# Patient Record
Sex: FEMALE | Race: WHITE
[De-identification: names, ages, dates, MRNs, and addresses within clinical notes are randomized per-mention and may not be internally consistent; named-entity substitution may affect disease eponyms.]

---

## 2020-02-19 ENCOUNTER — HOSPITAL ENCOUNTER (OUTPATIENT)
Dept: HOSPITAL 92 - BICMRI | Age: 20
Discharge: HOME | End: 2020-02-19
Attending: ORTHOPAEDIC SURGERY
Payer: OTHER GOVERNMENT

## 2020-02-19 DIAGNOSIS — M25.562: Primary | ICD-10-CM

## 2020-02-19 DIAGNOSIS — S70.12XA: ICD-10-CM

## 2020-02-19 DIAGNOSIS — S83.242A: ICD-10-CM

## 2020-02-19 DIAGNOSIS — S86.812A: ICD-10-CM

## 2020-02-19 DIAGNOSIS — S83.512A: ICD-10-CM

## 2020-02-19 NOTE — MRI
MRI OF THE LEFT KNEE PERFORMED WITHOUT CONTRAST ENHANCEMENT:

2/19/20

 

HISTORY: 

Right knee pain after twisting injury. 

 

The ACL is absent. There is very little residual fibers present. I would still presume this is most l
ikely acute in nature as there is a posterior tibial bone contusion on the lateral side and osteochon
dral impaction type injury of the lateral femoral condyle. Posterior cruciate ligament is intact. The
re is an undersurface tear of the posterior horn and the medial meniscus. This has more of a flap  or
ientation. The tear does extend to involve the body of the meniscus. The lateral meniscus has a yves
l shape and contour. 

 

There are edema changes both superficial and deep to the medial collateral ligament and the meniscofe
moral ligament is indistinct. The meniscotibial ligament is visualized. 

 

The lateral collateral ligament is intact. There is some edema change adjacent to the posterolateral 
corner and some edema changes related to the soleus muscle directly posterior to the fibula suggests 
some element of muscle strain in this region. 

 

IMPRESSION: 

1.      ACL tear. It is actually difficult to identify any fibers to the ACL but the presence of post
erior tibial and lateral femoral condyle bone contusions would suggest that this is still probably an
 acute injury. 

2.      Fairly extensive undersurface tear involving the posterior horn and body of the medial menisc
us. There is also edema changes and indistinct appearance of the meniscofemoral ligament. The MCL is 
intact although there are edema changes superficial and deep to the MCL.

3.      Mild edema changes involving the posterior lateral corner of the lateral collateral ligament 
is intact.  Popliteal fibular ligament is indistinct and there is some evidence for soleus muscle str
ain. 

 

POS: [facility]

## 2020-03-06 ENCOUNTER — HOSPITAL ENCOUNTER (OUTPATIENT)
Dept: HOSPITAL 92 - SDC | Age: 20
Setting detail: OBSERVATION
LOS: 1 days | Discharge: HOME | End: 2020-03-07
Attending: ORTHOPAEDIC SURGERY | Admitting: ORTHOPAEDIC SURGERY
Payer: OTHER GOVERNMENT

## 2020-03-06 VITALS — BODY MASS INDEX: 24.7 KG/M2
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
## 2020-03-06 DIAGNOSIS — S83.282A: ICD-10-CM

## 2020-03-06 DIAGNOSIS — S83.242A: ICD-10-CM

## 2020-03-06 DIAGNOSIS — S89.82XA: ICD-10-CM

## 2020-03-06 DIAGNOSIS — G89.18: ICD-10-CM

## 2020-03-06 DIAGNOSIS — Z79.899: ICD-10-CM

## 2020-03-06 DIAGNOSIS — S83.512A: Primary | ICD-10-CM
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
## 2020-03-06 PROCEDURE — 0MRP47Z REPLACEMENT OF LEFT KNEE BURSA AND LIGAMENT WITH AUTOLOGOUS TISSUE SUBSTITUTE, PERCUTANEOUS ENDOSCOPIC APPROACH: ICD-10-PCS | Performed by: ORTHOPAEDIC SURGERY

## 2020-03-06 PROCEDURE — G0378 HOSPITAL OBSERVATION PER HR: HCPCS

## 2020-03-06 PROCEDURE — A4306 DRUG DELIVERY SYSTEM <=50 ML: HCPCS

## 2020-03-06 PROCEDURE — 3E0T3BZ INTRODUCTION OF ANESTHETIC AGENT INTO PERIPHERAL NERVES AND PLEXI, PERCUTANEOUS APPROACH: ICD-10-PCS | Performed by: ORTHOPAEDIC SURGERY

## 2020-03-06 PROCEDURE — 96365 THER/PROPH/DIAG IV INF INIT: CPT

## 2020-03-06 PROCEDURE — 96366 THER/PROPH/DIAG IV INF ADDON: CPT

## 2020-03-06 PROCEDURE — C1713 ANCHOR/SCREW BN/BN,TIS/BN: HCPCS

## 2020-03-06 PROCEDURE — 0SBD4ZZ EXCISION OF LEFT KNEE JOINT, PERCUTANEOUS ENDOSCOPIC APPROACH: ICD-10-PCS | Performed by: ORTHOPAEDIC SURGERY

## 2020-03-06 RX ADMIN — CEFAZOLIN SODIUM SCH MLS: 2 SOLUTION INTRAVENOUS at 15:12

## 2020-03-06 RX ADMIN — CEFAZOLIN SODIUM SCH MLS: 2 SOLUTION INTRAVENOUS at 20:05

## 2020-03-06 NOTE — OP
DATE OF PROCEDURE:  03/06/2020



PREOPERATIVE DIAGNOSES:  Left knee Anterior cruciate ligament tear and an 
undersurface

posterior horn medial meniscus tear. 



POSTOPERATIVE DIAGNOSES:  

1. Left knee Anterior cruciate ligament tear.

2. Some chondral injury to the medial femoral condyle near the notch with some 
of

the areas being grade 2, but most of this just being fissuring. 

3. Stable undersurface tear of the posterior horn medial meniscus which is less 
than

5 mm. 

4. A stable superior surface tear of the lateral meniscus posterior horn.



PROCEDURE:

1. Exam under anesthesia Left knee

2. Left knee arthroscopy with arthroscopically assisted ACL Reconstruction 
using autologous patellar tendon graft



ASSISTANT:  Van Infante PA-C



ESTIMATED BLOOD LOSS:  Minimal.



COMPLICATIONS:  None.



ANESTHESIA:  She had a general anesthetic as well as preoperative block.



DISPOSITION:  She went to recovery room in stable condition.



IMPLANTS:  We used a 7 x 25 metal interference screw on the femur and used

bicortical screw with a smooth washer on the tibia. 



INDICATIONS FOR PROCEDURE:  This is a 19-year-old female who sounds like she 
injured

her left knee quite a while ago and had a recent pivot-shift episode while 
playing

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 
an

ACL tear and some meniscal pathology.  At this time, she and her parents 
decided to

have this reconstruction performed here locally. 



DESCRIPTION OF PROCEDURE:  After all appropriate consent forms were explained 
and

signed, Christelle was taken to operative room and at this time was given general

anesthetic.  Once the level of anesthesia was appropriate, a tourniquet was 
placed

on the left thigh and the left leg had an exam under anesthesia performed 
confirming

that she had a positive Lachman exam, negative posterior drawer and was stable 
to

varus and valgus stress.  At this time, the leg was placed in arthroscopic leg

delgado, was then prepped and draped in standard surgical fashion.  The limb was 
then

exsanguinated and tourniquet was taken up to 250 mmHg.  A midline incision was 
made

with a 10-blade down through the skin.  Bovie was used to coagulate any brisk 
venous

bleeding.  At this time, a new blade was used to take our paratenon off the

underlying patellar tendon.  Central third patellar tendon grafts were 
harvested and

taken to the back table and made so that the femoral plug was size 9, tibial 
plug

was a size 10.  The graft site was loosely closed with multiple interrupted 
Vicryl.

Inferolateral portal was then established.  Scope was placed into the knee 
joint.

Diagnostic arthroscopy commenced.  There was just a little tiny strand of ACL 
left

and that was it.  The PCL was intact.  The patellofemoral joint was in good

condition.  The medial femur had some chondral damage to it.  The deepest areas 
were

some grade 2 but mostly look like fissuring.  The medial tibial plateau was in 
good

condition.  The medial meniscus was intact on its superior surface throughout.

There was a small undersurface tear of the posterior horn medial meniscus, 
which was

completely stable and left alone.  The lateral compartment found the femur and 
tibia

to be good condition.  There was a superior surface lateral meniscus tear that 
did

not penetrate all the way through the meniscus and this was just roughened up 
on the

surface.  At this time, the gutters were swept through, no loose bodies were 
noted.

At this time, we then performed our notchplasty in standard fashion.  We then 
flexed

the knee and through the medial portal, placed an over-the-top jig to place a 
pin up

and out through the anterolateral thigh.  A 9-mm reamer was used to ream our 
tunnel

to just over 25 mm and at this time, all loose bony and cartilaginous debris was

removed from the knee joint.  At this time, the tibial guide was placed into the

knee set at 52.5 degrees.  A pin was placed up into the knee.  A 10 mm reamer 
was

used to ream our tibial tunnel.  All loose bony cartilaginous debris was removed

from the knee joint.  At this time, a red rasp and steven were used to smooth off 
any

rough edges.  We then went dry.  We flexed the knee up again.  We placed our 
pin up

and out the anterolateral thigh, using this to pull our passing suture up into 
the

knee joint.  Once this was done, this was pulled down the tibial tunnel and was 
used

to pull our graft up into the knee.  A 7 x 25 metal interference screw was used 
to

fixate our femoral side and we then drilled tapped and placed a bicortical screw

with a smooth washer, tying our strings around this as it apposed with the knee 
in

full extension and posterior drawer being applied.  Once this was done, the 
knee was

taken through full range of motion under direct visualization with the camera 
and

was found to have no impingement in flexion or extension.  Scope was removed.  
Knee

was drained.  At this time, we then bone grafted our patellar defect.  We 
placed a

piece of Gelfoam on our tibial defect.  We ran our paratenon with a running 
Vicryl

suture, 2-0 Vicryl, StrataFix and Surgicel skin glue on top.  Once this had 
dried,

bulky sterile dressing was placed.  Had the tourniquet let down.  Toes pinked up

nicely.  She was awakened.  She was taken to recovery room in stable condition.
  All

counts were correct at the end of the case.  She received preoperative IV

antibiotics. 







Job ID:  [id number]



[facility] Center[facility]ANDO

## 2020-03-07 VITALS — TEMPERATURE: 98.5 F | DIASTOLIC BLOOD PRESSURE: 65 MMHG | SYSTOLIC BLOOD PRESSURE: 105 MMHG
